# Patient Record
Sex: FEMALE | Race: WHITE | NOT HISPANIC OR LATINO | Employment: OTHER | URBAN - METROPOLITAN AREA
[De-identification: names, ages, dates, MRNs, and addresses within clinical notes are randomized per-mention and may not be internally consistent; named-entity substitution may affect disease eponyms.]

---

## 2017-03-08 ENCOUNTER — TELEPHONE (OUTPATIENT)
Dept: PODIATRY | Facility: CLINIC | Age: 77
End: 2017-03-08

## 2017-04-07 ENCOUNTER — OFFICE VISIT (OUTPATIENT)
Dept: PODIATRY | Facility: CLINIC | Age: 77
End: 2017-04-07
Payer: MEDICARE

## 2017-04-07 VITALS
WEIGHT: 142.63 LBS | DIASTOLIC BLOOD PRESSURE: 72 MMHG | RESPIRATION RATE: 18 BRPM | SYSTOLIC BLOOD PRESSURE: 142 MMHG | HEART RATE: 70 BPM | BODY MASS INDEX: 33.01 KG/M2 | HEIGHT: 55 IN

## 2017-04-07 DIAGNOSIS — M21.6X2 PLANTAR FLEXED METATARSAL, LEFT: ICD-10-CM

## 2017-04-07 DIAGNOSIS — L84 CORN OR CALLUS: Primary | ICD-10-CM

## 2017-04-07 DIAGNOSIS — M20.5X2 OVERLAPPING TOE, LEFT: ICD-10-CM

## 2017-04-07 DIAGNOSIS — M89.8X9 BONY EXOSTOSIS: ICD-10-CM

## 2017-04-07 DIAGNOSIS — L90.9 FAT PAD ATROPHY OF FOOT: ICD-10-CM

## 2017-04-07 PROCEDURE — 17999 UNLISTD PX SKN MUC MEMB SUBQ: CPT | Mod: CSM,,, | Performed by: PODIATRIST

## 2017-04-07 PROCEDURE — 99213 OFFICE O/P EST LOW 20 MIN: CPT | Mod: PBBFAC,PO | Performed by: PODIATRIST

## 2017-04-07 PROCEDURE — 99999 PR PBB SHADOW E&M-EST. PATIENT-LVL III: CPT | Mod: PBBFAC,,, | Performed by: PODIATRIST

## 2017-04-07 PROCEDURE — 99499 UNLISTED E&M SERVICE: CPT | Mod: CSM,,, | Performed by: PODIATRIST

## 2017-04-07 NOTE — PATIENT INSTRUCTIONS
How to Order Large Interdigital Pads from [employer]:   (Toe Separators, Large #8130)     Call [phone] and present them with item number 54515. [facility] will ask for your insurance information and you may possibly be able to bill your insurance for the price of the pads depending on what insurance you have. [facility] will then mail the bag of pads to your house.     Additional Information:     [facility] PK/12    You may also use their web site, [email or web address], for ordering.     **Note: This information is for the LARGE interdigital pads. There is no item number for the SMALL. [facility] has asked that when patients call to place the order, they advise the  that they are ordering the SMALL size, but do not have the item number. Also, please feel free to call the clinic at [phone] with any questions or concerns.**

## 2017-04-07 NOTE — MR AVS SNAPSHOT
[place] [facility]  [address] [place] [address]  Phone: [phone]                  Sherry Ford   2017 11:00 AM   Office Visit    Description:  Female : 1940   Provider:  Kassandra Holden DPM   Department:  [place] [facility]           Reason for Visit     Foot Ulcer     Nail Care                To Do List           Goals (5 Years of Data)     None      [facility]     [facility] Care [facility] -  Assistance  Unless otherwise directed by your provider, please contact [facility], our nurse care line that is available for  assistance.     Registered nurses in the [facility] provide: appointment scheduling, clinical advisement, health education, and other advisory services.  Call: [phone] (toll free)               Medications           Message regarding Medications     Verify the changes and/or additions to your medication regime listed below are the same as discussed with your clinician today.  If any of these changes or additions are incorrect, please notify your healthcare provider.        STOP taking these medications     fluticasone (FLONASE) 50 mcg/actuation nasal spray USE 1 SPRAY INTRANASALLY QD           Verify that the below list of medications is an accurate representation of the medications you are currently taking.  If none reported, the list may be blank. If incorrect, please contact your healthcare provider. Carry this list with you in case of emergency.           Current Medications     amlodipine (NORVASC) 5 MG tablet TK 1 T PO QD    calcitRIOL (ROCALTROL) 0.25 MCG Cap Take 0.25 mcg by mouth.    duloxetine (CYMBALTA) 60 MG capsule Take 60 mg by mouth.    ergocalciferol (VITAMIN D2) 50,000 unit Cap Take 50,000 Units by mouth.    ezetimibe (ZETIA) 10 mg tablet Take 10 mg by mouth.    k phos di & mono-sod phos mono (AV-PHOS 250 NEUTRAL) 250 mg Tab Take 250 mg by mouth.    levothyroxine (SYNTHROID) 25 MCG tablet TK 1 T  "PO QD    lorazepam (ATIVAN) 0.5 MG tablet Take 0.5 mg by mouth.    losartan (COZAAR) 50 MG tablet Take 50 mg by mouth.    tramadol (ULTRAM) 50 mg tablet Take 50 mg by mouth.    mupirocin (BACTROBAN) 2 % ointment LAURA  A SMALL AMT AA TID           Clinical Reference Information           Your Vitals Were     BP Pulse Resp Height Weight BMI    142/72 70 18 4' 7" (1.397 m) 64.7 kg (142 lb 10.2 oz) 33.15 kg/m2      Blood Pressure          Most Recent Value    BP  (!)  142/72      Allergies as of 4/7/2017     No Known Allergies      Immunizations Administered on Date of Encounter - 4/7/2017     None      [facility] Sign-Up     Activating your [facility] account is as easy as 1-2-3!     1) Visit [email or web address], select Sign Up Now, enter this activation code and your date of birth, then select Next.  [email or web address]  Expires: 5/22/2017 11:45 AM      2) Create a username and password to use when you visit [facility] in the future and select a security question in case you lose your password and select Next.    3) Enter your e-mail address and click Sign Up!    Additional Information  If you have questions, please e-mail [email or web address] or call [phone] to talk to our [facility] staff. Remember, [facility] is NOT to be used for urgent needs. For medical emergencies, dial 911.         Instructions    How to Order Large Interdigital Pads from [employer]:   (Toe Separators, Large #0168)     Call [phone] and present them with item number 38932. [facility] will ask for your insurance information and you may possibly be able to bill your insurance for the price of the pads depending on what insurance you have. [employer] will then mail the bag of pads to your house.     Additional Information:     [employer] PK/12    You may also use their web site, [email or web address], for ordering.     **Note: This information is for the LARGE interdigital pads. There is no item number for the SMALL. [facility] has asked that " when patients call to place the order, they advise the  that they are ordering the SMALL size, but do not have the item number. Also, please feel free to call the clinic at [phone] with any questions or concerns.**               Language Assistance Services     ATTENTION: Language assistance services are available, free of charge. Please call [phone].      ATENCIÓN: Si habla español, tiene a laboy disposición servicios gratuitos de asistencia lingüística. Llame al [phone].     CHÚ Ý: N?u b?n nói Ti?ng Vi?t, có các d?ch v? h? tr? ngôn ng? mi?n phí dành cho b?n. G?i s? [phone].         [facility] complies with applicable Federal civil rights laws and does not discriminate on the basis of race, color, national origin, age, disability, or sex.

## 2017-04-07 NOTE — PROGRESS NOTES
Subjective:      Patient ID: Sherry Ford is a 77 y.o. female.    Chief Complaint: Foot Ulcer (left foot- between 2nd and 3rd digit) and Nail Care (Dr. Kern 2 weeks ago)    Sherry Ford is a 77 y.o. female with complaint of corns, calluses and long, thick toenails that are difficult to manage on their own and requests help. Sherry has a past medical history of Arthritis; Depression; Hypertension; Hyperthyroidism; and Rickets..    PCP: Jahaira Kern MD      Current shoe gear: Casual shoes    No results found for: HGBA1C      Review of Systems   Constitution: Negative for chills and fever.   Cardiovascular: Negative for chest pain.   Respiratory: Negative for shortness of breath.    Gastrointestinal: Negative for nausea and vomiting.           Objective:      Physical Exam   Constitutional: She is oriented to person, place, and time. She appears well-developed and well-nourished. No distress.   Cardiovascular:   Pulses:       Dorsalis pedis pulses are 2+ on the right side, and 2+ on the left side.        Posterior tibial pulses are 2+ on the right side, and 2+ on the left side.   Capillary fill time 3-5 seconds to digits bilateral feet.   Musculoskeletal:   Lower extremities:    Deformities: hallux under-riding 2nd digit left.  Hypertrophic PIPJ condyles second and third digits left foot.  Plantarflexed fifth metatarsal left foot.  Fat pad atrophy bilaterally.    Normal arch height and rectus feet bilaterally.     5/5 muscle strength and tone in all four quadrants bilaterally.     Pain-free range of motion in all four quadrants with stiffness and limitation bilaterally.     Pain on palpation: sub keratotic lesions.   Neurological: She is alert and oriented to person, place, and time. She displays no Babinski's sign on the right side. She displays no Babinski's sign on the left side.   Plantar protective threshold sensation by touch via 5.07 SWMF present bilaterally.    Skin:   Lower extremities:    Normal turgor,  texture, temperature bilaterally. Digital hair present bilaterally. Warm equally bilaterally.     No edema, varicosities, pigmentary changes.    No malodor, erythema, interdigital maceration were noted.     Blister wound left medial 3rd digit pipj with hemorrhagic roof and serous drainage along macerated dermal wound base.    Hyperkeratotic lesion: medial 3rd digit pipj, lateral 2nd digit pipj and sub 5th met head left foot.     Nails are thick, elongated and incurvated bilaterally.       Psychiatric: She has a normal mood and affect.   Nursing note and vitals reviewed.            Assessment:       Encounter Diagnoses   Name Primary?    Corn or callus Yes    Overlapping toe, left     Bony exostosis     Plantar flexed metatarsal, left     Fat pad atrophy of foot          Plan:       Sherry was seen today for foot ulcer and nail care.    Diagnoses and all orders for this visit:    Corn or callus    Overlapping toe, left    Bony exostosis    Plantar flexed metatarsal, left    Fat pad atrophy of foot      I counseled the patient on her conditions, their implications and medical management.    After cleansing with alcohol prep pad, the above mentioned hyperkeratotic lesions were sharply trimmed x3 utilizing #15 blade to a smooth base without incident. Patient tolerated procedure well and reported comfort to the debridement sites. Offloading pads were dispensed with info to order.  Patient will continue to apply prescription urea lotion or over the counter alternatives to areas of recurrent skin build up.    - Shoe inspection. Patient instructed on proper foot hygeine. We discussed wearing proper shoe gear, daily foot inspections, never walking without protective shoe gear, never putting sharp instruments to feet, routine podiatric nail visits every 3-4 months.      - By patient request, nails were aggressively reduced and debrided x10 to their soft tissue attachment mechanically and with electric , removing  all offending nail and debris. Patient relates relief following the procedure. She will continue to monitor the areas daily, inspect her feet, wear protective shoe gear when ambulatory, moisturizer to maintain skin integrity and follow in this office in approximately 3-4 months, sooner p.r.n.

## 2017-07-03 ENCOUNTER — TELEPHONE (OUTPATIENT)
Dept: PODIATRY | Facility: CLINIC | Age: 77
End: 2017-07-03

## 2017-07-03 NOTE — TELEPHONE ENCOUNTER
Left message for patient to return call upon receiving voicemail.    Ladonna Dial MA  Office of Dr. Ning Harden DPM   Podiatry Department, [facility]

## 2017-07-03 NOTE — TELEPHONE ENCOUNTER
----- Message from Corrine Ruiz sent at 6/30/2017  3:36 PM CDT -----  Contact: patient  Calling concerning coming to be seen sooner than schedule which is 8/8/2017. Patient want only to see Dr. Holden. Please call patient ASAP @ [phone]. Thanks, freda

## 2017-09-22 ENCOUNTER — OFFICE VISIT (OUTPATIENT)
Dept: PODIATRY | Facility: CLINIC | Age: 77
End: 2017-09-22
Payer: MEDICARE

## 2017-09-22 ENCOUNTER — TELEPHONE (OUTPATIENT)
Dept: PODIATRY | Facility: CLINIC | Age: 77
End: 2017-09-22

## 2017-09-22 VITALS
HEART RATE: 64 BPM | SYSTOLIC BLOOD PRESSURE: 120 MMHG | BODY MASS INDEX: 32.64 KG/M2 | DIASTOLIC BLOOD PRESSURE: 64 MMHG | WEIGHT: 140.44 LBS

## 2017-09-22 DIAGNOSIS — M20.5X2 OVERLAPPING TOE, LEFT: ICD-10-CM

## 2017-09-22 DIAGNOSIS — M21.6X2 PLANTAR FLEXED METATARSAL, LEFT: ICD-10-CM

## 2017-09-22 DIAGNOSIS — L60.9 DISEASE OF NAIL: ICD-10-CM

## 2017-09-22 DIAGNOSIS — L90.9 FAT PAD ATROPHY OF FOOT: ICD-10-CM

## 2017-09-22 DIAGNOSIS — L84 CORN OR CALLUS: Primary | ICD-10-CM

## 2017-09-22 DIAGNOSIS — M89.8X9 BONY EXOSTOSIS: ICD-10-CM

## 2017-09-22 PROCEDURE — 99999 PR PBB SHADOW E&M-EST. PATIENT-LVL II: CPT | Mod: PBBFAC,,, | Performed by: PODIATRIST

## 2017-09-22 PROCEDURE — 99499 UNLISTED E&M SERVICE: CPT | Mod: CSM,,, | Performed by: PODIATRIST

## 2017-09-22 PROCEDURE — 99212 OFFICE O/P EST SF 10 MIN: CPT | Mod: PBBFAC,PO | Performed by: PODIATRIST

## 2017-09-22 PROCEDURE — 17999 UNLISTD PX SKN MUC MEMB SUBQ: CPT | Mod: CSM,,, | Performed by: PODIATRIST

## 2017-09-22 NOTE — PROGRESS NOTES
Office Visit 9/22/2017  Last encounter in this department: 4/7/2017    Subjective:      Patient ID: Sherry Ford is a 77 y.o. female.    Chief Complaint: Nail Care (pcp Jahaira Kern, 8/2017)    Sherry is a 77 y.o. female who presents to the clinic for evaluation and treatment of corns, calluses and long, thick toenails that are difficult to manage on their own and requests help. Sherry has a past medical history of Arthritis; Depression; Hypertension; Hyperthyroidism; and Rickets.  Patient relates that this is the best condition her feet have been in a long time.    PCP: Jahaira Kern MD    Date Last Seen by PCP: August 2017    Current shoe gear:  Affected Foot: Casual shoes     Unaffected Foot: Casual shoes    Last encounter in this department: 4/7/2017    No results found for: HGBA1C      Review of Systems   Constitution: Negative for chills and fever.   Cardiovascular: Negative for chest pain.   Respiratory: Negative for shortness of breath.    Gastrointestinal: Negative for nausea and vomiting.           Objective:      Physical Exam   Constitutional: She is oriented to person, place, and time. She appears well-developed and well-nourished. No distress.   Cardiovascular:   Pulses:       Dorsalis pedis pulses are 2+ on the right side, and 2+ on the left side.        Posterior tibial pulses are 2+ on the right side, and 2+ on the left side.   Capillary fill time 3-5 seconds to digits bilateral feet.   Musculoskeletal:   Lower extremities:    Deformities: hallux under-riding 2nd digit left.  Hypertrophic PIPJ condyles second and third digits left foot.  Plantarflexed fifth metatarsal left foot.  Fat pad atrophy bilaterally.    Normal arch height and rectus feet bilaterally.     5/5 muscle strength and tone in all four quadrants bilaterally.     Pain-free range of motion in all four quadrants with stiffness and limitation bilaterally.     Pain on palpation: sub keratotic lesions.   Neurological: She is alert and  oriented to person, place, and time. She displays no Babinski's sign on the right side. She displays no Babinski's sign on the left side.   Plantar protective threshold sensation by touch via 5.07 SWMF present bilaterally.    Skin:   Lower extremities:    Normal turgor, texture, temperature bilaterally. Digital hair present bilaterally. Warm equally bilaterally.     No edema, varicosities, pigmentary changes.    No malodor, erythema, interdigital maceration were noted.     Hyperkeratotic lesion: Dorsal lateral right fifth digit, sub 5th met head left foot.     Nails are thick, elongated and incurvated bilaterally.       Psychiatric: She has a normal mood and affect.   Nursing note and vitals reviewed.            X-rays: not indicated    Assessment:       Encounter Diagnoses   Name Primary?    Corn or callus Yes    Overlapping toe, left     Bony exostosis     Plantar flexed metatarsal, left     Fat pad atrophy of foot     Disease of nail          Plan:       Sherry was seen today for nail care.    Diagnoses and all orders for this visit:    Corn or callus    Overlapping toe, left    Bony exostosis    Plantar flexed metatarsal, left    Fat pad atrophy of foot    Disease of nail      I counseled the patient on her conditions, their implications and medical management.    After cleansing with alcohol prep pad, the above mentioned hyperkeratotic lesions were sharply trimmed x2 utilizing #15 blade to a smooth base without incident. Patient tolerated procedure well and reported comfort to the debridement sites. Offloading pads were dispensed with info to order.  Patient will continue to apply prescription urea lotion or over the counter alternatives to areas of recurrent skin build up.     - Shoe inspection. Patient instructed on proper foot hygeine. We discussed wearing proper shoe gear, daily foot inspections, never walking without protective shoe gear, never putting sharp instruments to feet, routine podiatric nail  visits every 3-4 months.       - By patient request, nails were aggressively reduced and debrided x10 to their soft tissue attachment mechanically and with electric , removing all offending nail and debris. Patient relates relief following the procedure. She will continue to monitor the areas daily, inspect her feet, wear protective shoe gear when ambulatory, moisturizer to maintain skin integrity and follow in this office in approximately 3-4 months, sooner p.r.n.  .

## 2017-09-22 NOTE — TELEPHONE ENCOUNTER
Returned patient's call, pt agreed to arrive at 230pm to be seen sooner.  Ladonna Dial MA  Office of KATIA LeijaM   Podiatry Department, [facility]

## 2017-09-22 NOTE — TELEPHONE ENCOUNTER
----- Message from Corrine Ruiz sent at 9/22/2017  9:48 AM CDT -----  Contact: patient  Returning your call concerning her appointment today. Please call patient ASAP @ [phone]. Thanks,  freda

## 2017-10-21 ENCOUNTER — HOSPITAL ENCOUNTER (EMERGENCY)
Facility: HOSPITAL | Age: 77
Discharge: HOME | End: 2017-10-21
Attending: INTERNAL MEDICINE
Payer: MEDICARE

## 2017-10-21 VITALS
TEMPERATURE: 98 F | SYSTOLIC BLOOD PRESSURE: 149 MMHG | HEART RATE: 80 BPM | DIASTOLIC BLOOD PRESSURE: 82 MMHG | OXYGEN SATURATION: 100 % | RESPIRATION RATE: 14 BRPM

## 2017-10-21 DIAGNOSIS — K56.41 FECAL IMPACTION IN RECTUM: Primary | ICD-10-CM

## 2017-10-21 DIAGNOSIS — K59.00 OBSTIPATION: ICD-10-CM

## 2017-10-21 PROCEDURE — 99283 EMERGENCY DEPT VISIT LOW MDM: CPT

## 2017-10-21 NOTE — ED NOTES
Dr. Reyes at bedside for manual disimpaction.  Large amt of hard stool evacuated.  Pt tolerated with some difficulty.  Pt placed on bedpan to attempt passing stool on her own.

## 2017-10-21 NOTE — ED PROVIDER NOTES
SCRIBE #1 NOTE: I, Marybelhung Rinaldi, am scribing for, and in the presence of, Altagracia Reyes MD. I have scribed the entire note.      History      Chief Complaint   Patient presents with    Constipation     had spinal stenosis surgery this past month. States she is unable to have BM since a few days after surgery.        Review of patient's allergies indicates:  No Known Allergies     HPI   HPI    10/21/2017, 3:50 PM   History obtained from the patient      History of Present Illness: Sherry Ford is a 77 y.o. female patient who presents to the Emergency Department for constipation which onset gradually 2-3 days PTA. Pt had spinal stenosis surgery 4 days PTA and has been taking Norco for pain. Symptoms are constant and moderate in severity. No mitigating or exacerbating factors reported. No other associated sxs reported at this time. Patient denies any fever, chills, abd pain, n/v/d, dysuria, hematuria, frequency, and all other sxs at this time. Prior Tx includes Magnesium Citrate and Miralax with no relief. No further complaints or concerns at this time.       Arrival mode: Personal vehicle      PCP: Jahaira Kern MD       Past Medical History:  Past Medical History:   Diagnosis Date    Arthritis     Depression     Hypertension     Hyperthyroidism     Rickets        Past Surgical History:  Past Surgical History:   Procedure Laterality Date    CERVICAL FUSION       SECTION      COLON SURGERY      FRACTURE SURGERY      TOTAL HIP ARTHROPLASTY           Family History:  History reviewed. No pertinent family history.    Social History:  Social History     Social History Main Topics    Smoking status: Never Smoker    Smokeless tobacco: Never Used    Alcohol use No    Drug use: No    Sexual activity: No       ROS   Review of Systems   Constitutional: Negative for chills and fever.   HENT: Negative for sore throat.    Respiratory: Negative for shortness of breath.    Cardiovascular: Negative  for chest pain.   Gastrointestinal: Positive for constipation. Negative for abdominal pain, diarrhea, nausea and vomiting.   Genitourinary: Negative for dysuria, frequency and hematuria.   Musculoskeletal: Negative for back pain.   Skin: Negative for rash.   Neurological: Negative for weakness.   Hematological: Does not bruise/bleed easily.   All other systems reviewed and are negative.      Physical Exam      Initial Vitals [10/21/17 1546]   BP Pulse Resp Temp SpO2   (!) 161/69 107 18 97.9 °F (36.6 °C) 97 %      MAP       99.67          Physical Exam  Nursing Notes and Vital Signs Reviewed.  Constitutional: Patient is in no acute distress. Well-developed and well-nourished.  Head: Atraumatic. Normocephalic.  Eyes: PERRL. EOM intact. Conjunctivae are not pale. No scleral icterus.  ENT: Mucous membranes are moist. Oropharynx is clear and symmetric.    Neck: Supple. Full ROM. No lymphadenopathy.  Cardiovascular: Regular rate. Regular rhythm. No murmurs, rubs, or gallops. Distal pulses are 2+ and symmetric.  Pulmonary/Chest: No respiratory distress. Clear to auscultation bilaterally. No wheezing, rales, or rhonchi.  Abdominal: Soft and non-distended.  There is no tenderness.  No rebound, guarding, or rigidity. Decreased bowel sounds.  Rectal:  No tenderness.  No masses.  No hemorrhoids.  Normal sphincter tone. Digitally disimpacted a large amount of stool.  Musculoskeletal: Moves all extremities. No obvious deformities. No edema. No calf tenderness.  Skin: Warm and dry.  Neurological:  Alert, awake, and appropriate.  Normal speech.  No acute focal neurological deficits are appreciated.  Psychiatric: Normal affect. Good eye contact. Appropriate in content.    ED Course    Procedures  ED Vital Signs:  Vitals:    10/21/17 1546 10/21/17 1758 10/21/17 1830   BP: (!) 161/69 (!) 156/77 (!) 149/82   Pulse: 107 90 80   Resp: 18 20 14   Temp: 97.9 °F (36.6 °C) 97.7 °F (36.5 °C) 98 °F (36.7 °C)   TempSrc: Oral Oral Oral   SpO2:  97% 99% 100%         Imaging Results:  Imaging Results          X-Ray Abdomen Flat And Erect (Final result)  Result time 10/21/17 17:37:52    Final result by Jonah Jade MD (10/21/17 17:37:52)                 Impression:           1. Moderate air and fluid within the proximal and mid colon.  Moderate stool in the distal colon.  Negative for dilated small bowel loops.  Distal colonic constipation, causing moderate amount of retained adherent stool the proximal colon most likely is the cause of this appearance.  A distal colonic obstructing process is difficult to completely exclude.  2.  Postoperative changes though left of the umbilicus region.  Clinical correlation is advised.  Has the patient had recent surgery?  3.  Incidental findings as noted above.      Electronically signed by: JONAH JADE MD  Date:     10/21/17  Time:    17:37              Narrative:    Abdomen flat and erect, 2 views:    Clinical Indication: Constipation.      Findings:    No comparison studies are available. There are skin staples overlying the abdomen, likely to the left of the umbilicus.  Has the patient had recent abdominal surgery question kentrell there is moderate air and fluid within the proximal and mid colon, with moderate stool in the descending colon, sigmoid colon and rectum.  Negative for dilated loops of small bowel.  Negative for free air.    Postoperative changes involve the left hip.  Moderate degenerative changes involve the right hip and lumbar spine.    Cholecystectomy clips.  Mild elevation of the right hemidiaphragm with scarring or atelectasis in the right lung base, with similar changes in the left infrahilar region.  Lung bases otherwise clear.                                      The Emergency Provider reviewed the vital signs and test results, which are outlined above.    ED Discussion     6:19 PM: Reassessed pt at this time.  Pt states her condition has improved at this time. Pt has been able to pass stool on  her own and is refusing enema at this time. Discussed with pt all pertinent ED information and results. Discussed pt dx and plan of tx. Gave pt all f/u and return to the ED instructions. All questions and concerns were addressed at this time. Pt expresses understanding of information and instructions, and is comfortable with plan to discharge. Pt is stable for discharge.    I discussed with patient and/or family/caretaker that evaluation in the ED does not suggest any emergent or life threatening medical conditions requiring immediate intervention beyond what was provided in the ED, and I believe patient is safe for discharge.  Regardless, an unremarkable evaluation in the ED does not preclude the development or presence of a serious of life threatening condition. As such, patient was instructed to return immediately for any worsening or change in current symptoms.      ED Medication(s):  Medications - No data to display    Discharge Medication List as of 10/21/2017  6:21 PM          Follow-up Information     Go to  [facility] - .    Specialty:  Emergency Medicine  Why:  If symptoms worsen  Contact information:  [address] [facility] [address] [phone]           Jahaira Kern MD. Schedule an appointment as soon as possible for a visit in 3 days.    Specialty:  Family Medicine  Contact information:  [address] [facility] [address]  [facility] [address] [phone]                     Medical Decision Making    Medical Decision Making:   Clinical Tests:   Radiological Study: Ordered and Reviewed           Scribe Attestation:   Scribe #1: I performed the above scribed service and the documentation accurately describes the services I performed. I attest to the accuracy of the note.    Attending:   Physician Attestation Statement for Scribe #1: I, Altagracia Reyes MD, personally performed the services described in this documentation, as scribed by Marybel Rinaldi, in my  presence, and it is both accurate and complete.          Clinical Impression       ICD-10-CM ICD-9-CM   1. Fecal impaction in rectum K56.41 560.32   2. Obstipation K59.00 564.00       Disposition:   Disposition: Discharged  Condition: Stable         Altagracia Reyes MD  10/21/17 4583

## 2017-10-21 NOTE — ED NOTES
Pt able to pass moderate amt of stool and flatus.  Pt reports she does not want to have enema.  MD valdez with pt dispo.  Pt aa0x4, resp e/u skin w/d. Family at bedside and updated. Pt ready for dispo

## 2018-01-01 ENCOUNTER — HOSPITAL ENCOUNTER (EMERGENCY)
Facility: HOSPITAL | Age: 78
Discharge: HOME | End: 2018-01-01
Payer: MEDICARE

## 2018-01-01 VITALS
BODY MASS INDEX: 27.48 KG/M2 | DIASTOLIC BLOOD PRESSURE: 86 MMHG | RESPIRATION RATE: 16 BRPM | SYSTOLIC BLOOD PRESSURE: 158 MMHG | OXYGEN SATURATION: 95 % | TEMPERATURE: 98 F | HEART RATE: 85 BPM | WEIGHT: 140 LBS | HEIGHT: 60 IN

## 2018-01-01 DIAGNOSIS — M25.561 ACUTE PAIN OF RIGHT KNEE: ICD-10-CM

## 2018-01-01 DIAGNOSIS — M25.551 HIP PAIN, RIGHT: Primary | ICD-10-CM

## 2018-01-01 DIAGNOSIS — M79.606 LEG PAIN: ICD-10-CM

## 2018-01-01 PROCEDURE — 99284 EMERGENCY DEPT VISIT MOD MDM: CPT

## 2018-01-01 PROCEDURE — 25000003 PHARM REV CODE 250: Performed by: PHYSICIAN ASSISTANT

## 2018-01-01 RX ORDER — PREDNISONE 20 MG/1
40 TABLET ORAL DAILY
Qty: 10 TABLET | Refills: 0 | Status: SHIPPED | OUTPATIENT
Start: 2018-01-01 | End: 2018-01-06

## 2018-01-01 RX ORDER — TRAMADOL HYDROCHLORIDE 50 MG/1
50 TABLET ORAL EVERY 6 HOURS PRN
Qty: 12 TABLET | Refills: 0 | Status: SHIPPED | OUTPATIENT
Start: 2018-01-01 | End: 2018-01-11

## 2018-01-01 RX ORDER — OXYCODONE AND ACETAMINOPHEN 10; 325 MG/1; MG/1
1 TABLET ORAL
Status: DISCONTINUED | OUTPATIENT
Start: 2018-01-01 | End: 2018-01-01

## 2018-01-01 RX ORDER — HYDROCODONE BITARTRATE AND ACETAMINOPHEN 5; 325 MG/1; MG/1
1 TABLET ORAL
Status: COMPLETED | OUTPATIENT
Start: 2018-01-01 | End: 2018-01-01

## 2018-01-01 RX ORDER — OXYCODONE AND ACETAMINOPHEN 5; 325 MG/1; MG/1
1 TABLET ORAL EVERY 4 HOURS PRN
COMMUNITY

## 2018-01-01 RX ADMIN — HYDROCODONE BITARTRATE AND ACETAMINOPHEN 1 TABLET: 5; 325 TABLET ORAL at 02:01

## 2018-01-01 NOTE — ED PROVIDER NOTES
Encounter Date: 2018       History     Chief Complaint   Patient presents with    Leg Pain     2 days     The history is provided by the patient.   Leg Pain    There was no injury mechanism. The incident occurred several days ago. The pain is present in the right knee and right hip. The quality of the pain is described as aching. The pain is at a severity of 4/10. The pain has been constant since onset. Pertinent negatives include no numbness, no inability to bear weight, no loss of motion, no muscle weakness, no loss of sensation and no tingling. The symptoms are aggravated by activity, bearing weight and palpation. She has tried nothing for the symptoms.     Review of patient's allergies indicates:  No Known Allergies  Past Medical History:   Diagnosis Date    Arthritis     Depression     Hypertension     Hyperthyroidism     Rickets     Spinal stenosis      Past Surgical History:   Procedure Laterality Date    CERVICAL FUSION       SECTION      COLON SURGERY      FRACTURE SURGERY      TOTAL HIP ARTHROPLASTY       History reviewed. No pertinent family history.  Social History   Substance Use Topics    Smoking status: Never Smoker    Smokeless tobacco: Never Used    Alcohol use No     Review of Systems   Constitutional: Negative for chills and fever.   HENT: Negative for sore throat.    Eyes: Negative for photophobia and redness.   Respiratory: Negative for cough and shortness of breath.    Cardiovascular: Negative for chest pain.   Gastrointestinal: Negative for abdominal pain, diarrhea and nausea.   Endocrine: Negative for polydipsia and polyphagia.   Genitourinary: Negative for dysuria.   Musculoskeletal: Negative for arthralgias, back pain and myalgias.        Right leg pain     Skin: Negative for rash.   Neurological: Negative for tingling, weakness, numbness and headaches.   Hematological: Does not bruise/bleed easily.   Psychiatric/Behavioral: The patient is not nervous/anxious.     All other systems reviewed and are negative.      Physical Exam     Initial Vitals [01/01/18 1238]   BP Pulse Resp Temp SpO2   (!) 158/86 85 16 98.2 °F (36.8 °C) 95 %      MAP       110         Physical Exam    Nursing note and vitals reviewed.  Constitutional: Vital signs are normal. She appears well-developed and well-nourished. She appears distressed (secondary to pain).   HENT:   Head: Normocephalic and atraumatic.   Right Ear: External ear normal.   Left Ear: External ear normal.   Nose: Nose normal.   Mouth/Throat: Oropharynx is clear and moist.   Eyes: Conjunctivae, EOM and lids are normal. Pupils are equal, round, and reactive to light.   Neck: Normal range of motion and full passive range of motion without pain. Neck supple.   Cardiovascular: Normal rate, regular rhythm, S1 normal, S2 normal, normal heart sounds, intact distal pulses and normal pulses.   Pulmonary/Chest: Breath sounds normal. No respiratory distress. She has no wheezes. She has no rales.   Abdominal: Soft. Normal appearance and bowel sounds are normal. She exhibits no distension. There is no tenderness.   Musculoskeletal:        Right hip: She exhibits decreased range of motion, decreased strength and tenderness.        Right knee: She exhibits decreased range of motion. Tenderness found. Medial joint line and lateral joint line tenderness noted.   Lymphadenopathy:     She has no cervical adenopathy.   Neurological: She is alert and oriented to person, place, and time. She has normal strength. No cranial nerve deficit or sensory deficit. Coordination and gait normal.   Skin: Skin is warm, dry and intact.   Psychiatric: She has a normal mood and affect. Her speech is normal and behavior is normal. Judgment and thought content normal. Cognition and memory are normal.         ED Course   Procedures  Labs Reviewed - No data to display                            ED Course      Clinical Impression:   The primary encounter diagnosis was Hip  pain, right. Diagnoses of Leg pain and Acute pain of right knee were also pertinent to this visit.    Disposition:   Disposition: Discharged  Condition: Stable                        COLE Cabrera  01/01/18 7646

## 2018-02-23 ENCOUNTER — OFFICE VISIT (OUTPATIENT)
Dept: PODIATRY | Facility: CLINIC | Age: 78
End: 2018-02-23
Payer: MEDICARE

## 2018-02-23 VITALS — HEIGHT: 60 IN | DIASTOLIC BLOOD PRESSURE: 88 MMHG | SYSTOLIC BLOOD PRESSURE: 132 MMHG | RESPIRATION RATE: 16 BRPM

## 2018-02-23 DIAGNOSIS — L84 CORN OR CALLUS: Primary | ICD-10-CM

## 2018-02-23 DIAGNOSIS — L60.9 DISEASE OF NAIL: ICD-10-CM

## 2018-02-23 PROCEDURE — 99499 UNLISTED E&M SERVICE: CPT | Mod: CSM,,, | Performed by: PODIATRIST

## 2018-02-23 PROCEDURE — 99999 PR PBB SHADOW E&M-EST. PATIENT-LVL III: CPT | Mod: PBBFAC,,, | Performed by: PODIATRIST

## 2018-02-23 PROCEDURE — 99213 OFFICE O/P EST LOW 20 MIN: CPT | Mod: PBBFAC,PO | Performed by: PODIATRIST

## 2018-02-23 PROCEDURE — 17999 UNLISTD PX SKN MUC MEMB SUBQ: CPT | Mod: CSM,,, | Performed by: PODIATRIST

## 2018-03-04 NOTE — PROGRESS NOTES
Last encounter in this department: 9/22/2017    Subjective:      Patient ID: Sherry Ford is a 78 y.o. female.    Chief Complaint: Routine Foot Care ([facility], c/o unusual foot odor, right hallux nail pain, Last visit with PCP Dr. Jahaira Kern on 1 week ago (Per Pt))    Sherry is a 78 y.o. female who presents to the clinic complaining of corns, calluses and long, thick toenails that are difficult to manage on their own and requests help. Sherry has a past medical history of Arthritis; Depression; Hypertension; Hyperthyroidism; and Rickets.  Patient relates that this is the best condition her feet have been in a long time.    Review of Systems   Constitution: Negative for chills and fever.   Cardiovascular: Negative for chest pain.   Respiratory: Negative for shortness of breath.    Gastrointestinal: Negative for nausea and vomiting.           Objective:      Physical Exam   Constitutional: She is oriented to person, place, and time. She appears well-developed and well-nourished. No distress.   Cardiovascular:   Pulses:       Dorsalis pedis pulses are 2+ on the right side, and 2+ on the left side.        Posterior tibial pulses are 2+ on the right side, and 2+ on the left side.   Capillary fill time 3-5 seconds to digits bilateral feet.   Musculoskeletal:   Lower extremities:    Deformities: hallux under-riding 2nd digit left.  Hypertrophic PIPJ condyles second and third digits left foot.  Plantarflexed fifth metatarsal left foot.  Fat pad atrophy bilaterally.    Normal arch height and rectus feet bilaterally.     5/5 muscle strength and tone in all four quadrants bilaterally.     Pain-free range of motion in all four quadrants with stiffness and limitation bilaterally.     Pain on palpation: sub keratotic lesions.   Neurological: She is alert and oriented to person, place, and time. She displays no Babinski's sign on the right side. She displays no Babinski's sign on the left side.   Plantar protective threshold  sensation by touch via 5.07 SWMF present bilaterally.    Skin:   Lower extremities:    Normal turgor, texture, temperature bilaterally. Digital hair present bilaterally. Warm equally bilaterally.     No edema, varicosities, pigmentary changes.    No malodor, erythema, interdigital maceration were noted.     Hyperkeratotic lesion: Dorsal lateral right fifth digit, sub 5th met head left foot.     Nails are thick, elongated and incurvated bilaterally.       Psychiatric: She has a normal mood and affect.   Nursing note and vitals reviewed.                Assessment:       Encounter Diagnoses   Name Primary?    Corn or callus Yes    Disease of nail          Plan:       Sherry was seen today for routine foot care.    Diagnoses and all orders for this visit:    Corn or callus    Disease of nail      I counseled the patient on her conditions, their implications and medical management.  After cleansing with alcohol prep pad, the above mentioned hyperkeratotic lesions were sharply trimmed x2 utilizing #15 blade to a smooth base without incident. Patient tolerated procedure well and reported comfort to the debridement sites. Offloading pads were dispensed with info to order.  Patient will continue to apply prescription urea lotion or over the counter alternatives to areas of recurrent skin build up.     - Shoe inspection. Patient instructed on proper foot hygeine. We discussed wearing proper shoe gear, daily foot inspections, never walking without protective shoe gear, never putting sharp instruments to feet, routine podiatric nail visits every 3-4 months.       - By patient request, nails were aggressively reduced and debrided x10 to their soft tissue attachment mechanically and with electric , removing all offending nail and debris. Patient relates relief following the procedure. She will continue to monitor the areas daily, inspect her feet, wear protective shoe gear when ambulatory, moisturizer to maintain skin  integrity and follow in this office in approximately 3-4 months, sooner p.r.n.

## 2018-06-26 ENCOUNTER — TELEPHONE (OUTPATIENT)
Dept: PODIATRY | Facility: CLINIC | Age: 78
End: 2018-06-26

## 2018-06-26 NOTE — TELEPHONE ENCOUNTER
Returned call to scheduled missed appt. Appt rescheduled to Thursday at the [facility] with Dr. Holden at

## 2018-06-26 NOTE — TELEPHONE ENCOUNTER
----- Message from Radhames Ellis sent at 6/26/2018  3:16 PM CDT -----  Contact: Jaime Garrido (Grandchild)  Jaime Garrido (Grandchild) is requesting a call from nurse to see if the pt ca be seen before the provider leaves.      Please call Jaime Garrido (Grandchild) [phone]

## 2018-06-28 ENCOUNTER — OFFICE VISIT (OUTPATIENT)
Dept: PODIATRY | Facility: CLINIC | Age: 78
End: 2018-06-28

## 2018-06-28 VITALS
SYSTOLIC BLOOD PRESSURE: 157 MMHG | HEIGHT: 60 IN | RESPIRATION RATE: 16 BRPM | DIASTOLIC BLOOD PRESSURE: 75 MMHG | HEART RATE: 65 BPM

## 2018-06-28 DIAGNOSIS — L60.9 DISEASE OF NAIL: ICD-10-CM

## 2018-06-28 DIAGNOSIS — L84 CORN OR CALLUS: Primary | ICD-10-CM

## 2018-06-28 PROCEDURE — 99999 PR PBB SHADOW E&M-EST. PATIENT-LVL III: CPT | Mod: PBBFAC,,, | Performed by: PODIATRIST

## 2018-06-28 PROCEDURE — 17999 UNLISTD PX SKN MUC MEMB SUBQ: CPT | Mod: CSM,,, | Performed by: PODIATRIST

## 2018-06-28 PROCEDURE — 99499 UNLISTED E&M SERVICE: CPT | Mod: CSM,,, | Performed by: PODIATRIST

## 2018-06-28 NOTE — PROGRESS NOTES
Office Visit 6/28/2018  Last encounter in this department: Visit date not found    Subjective:      Patient ID: Sherry Ford is a 78 y.o. female.    Chief Complaint: Routine Foot Care ([facility], wears tennis shoes, no c/o pain, diabetic Pt, last visit with Dr. Concha holland 1 week ago (Per Pt) )    Sherry is a 78 y.o. female who presents to the clinic complaining of corns, calluses and long thick nails that are difficult to manage on her own and requests help. Sherry has a past medical history of Arthritis; Depression; Hypertension; Hyperthyroidism; Rickets; and Spinal stenosis.     PCP: Jahaira Kern MD    Date Last Seen by PCP: 6/21/18    Current shoe gear:  Affected Foot: Casual shoes     Unaffected Foot: Casual shoes    Last encounter in this department: Visit date not found    No results found for: HGBA1C      Review of Systems   Constitution: Negative for chills and fever.   Cardiovascular: Negative for chest pain.   Respiratory: Negative for shortness of breath.    Gastrointestinal: Negative for nausea and vomiting.           Objective:      Physical Exam   Constitutional: She is oriented to person, place, and time. She appears well-developed and well-nourished. No distress.   Cardiovascular:   Pulses:       Dorsalis pedis pulses are 2+ on the right side, and 2+ on the left side.        Posterior tibial pulses are 2+ on the right side, and 2+ on the left side.   Capillary fill time 3-5 seconds to digits bilateral feet.   Musculoskeletal:   Lower extremities:    Deformities: hallux under-riding 2nd digit left.  Hypertrophic PIPJ condyles second and third digits left foot.  Plantarflexed fifth metatarsal left foot.  Fat pad atrophy bilaterally.    Normal arch height and rectus feet bilaterally.     5/5 muscle strength and tone in all four quadrants bilaterally.     Pain-free range of motion in all four quadrants with stiffness and limitation bilaterally.     Pain on palpation: sub keratotic lesions.   Neurological:  She is alert and oriented to person, place, and time. She displays no Babinski's sign on the right side. She displays no Babinski's sign on the left side.   Plantar protective threshold sensation by touch via 5.07 SWMF present bilaterally.    Skin:   Lower extremities:    Normal turgor, texture, temperature bilaterally. Digital hair present bilaterally. Warm equally bilaterally.     No edema, varicosities, pigmentary changes.    No malodor, erythema, interdigital maceration were noted.     Hyperkeratotic lesion: Dorsal lateral right fifth digit, sub 5th met head left foot.     Nails are thick, elongated and incurvated bilaterally.       Psychiatric: She has a normal mood and affect.   Nursing note and vitals reviewed.            X-rays: not indicated    Assessment:       Encounter Diagnoses   Name Primary?    Corn or callus Yes    Disease of nail          Plan:       Sherry was seen today for routine foot care.    Diagnoses and all orders for this visit:    Corn or callus    Disease of nail      I counseled the patient on her conditions, their implications and medical management.  After cleansing with alcohol prep pad, the above mentioned hyperkeratotic lesions were sharply trimmed x2 utilizing #15 blade to a smooth base without incident. Patient tolerated procedure well and reported comfort to the debridement sites. Offloading pads were dispensed with info to order.  Patient will continue to apply prescription urea lotion or over the counter alternatives to areas of recurrent skin build up.     - Shoe inspection. Patient instructed on proper foot hygeine. We discussed wearing proper shoe gear, daily foot inspections, never walking without protective shoe gear, never putting sharp instruments to feet, routine podiatric nail visits every 3-4 months.       - By patient request, nails were aggressively reduced and debrided x10 to their soft tissue attachment mechanically and with removing all offending nail and  debris. Patient relates relief following the procedure. She will continue to monitor the areas daily, inspect her feet, wear protective shoe gear when ambulatory, moisturizer to maintain skin integrity and follow in this office in approximately 3-4 months, sooner p.r.n.     .